# Patient Record
Sex: MALE | Race: ASIAN | Employment: STUDENT | URBAN - METROPOLITAN AREA
[De-identification: names, ages, dates, MRNs, and addresses within clinical notes are randomized per-mention and may not be internally consistent; named-entity substitution may affect disease eponyms.]

---
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
## 2021-05-28 ENCOUNTER — MOBILE ENCOUNTER (OUTPATIENT)
Dept: FAMILY MEDICINE CLINIC | Facility: CLINIC | Age: 21
End: 2021-05-28

## 2021-05-28 RX ORDER — ONDANSETRON 4 MG/1
4 TABLET, ORALLY DISINTEGRATING ORAL EVERY 8 HOURS PRN
Qty: 20 TABLET | Refills: 0 | Status: SHIPPED | OUTPATIENT
Start: 2021-05-28

## 2023-12-06 ENCOUNTER — OFFICE VISIT (OUTPATIENT)
Dept: OCCUPATIONAL MEDICINE | Age: 23
End: 2023-12-06
Attending: FAMILY MEDICINE

## 2023-12-06 DIAGNOSIS — Z01.84 IMMUNITY STATUS TESTING: Primary | ICD-10-CM

## 2023-12-06 LAB
HBV SURFACE AB SER QL: REACTIVE
HBV SURFACE AB SERPL IA-ACNC: >1000 MIU/ML
RUBV IGG SER QL: POSITIVE
RUBV IGG SER-ACNC: 130.2 IU/ML (ref 10–?)

## 2023-12-06 PROCEDURE — 86762 RUBELLA ANTIBODY: CPT

## 2023-12-06 PROCEDURE — 86787 VARICELLA-ZOSTER ANTIBODY: CPT

## 2023-12-06 PROCEDURE — 86706 HEP B SURFACE ANTIBODY: CPT

## 2023-12-06 PROCEDURE — 86735 MUMPS ANTIBODY: CPT

## 2023-12-06 PROCEDURE — 86765 RUBEOLA ANTIBODY: CPT

## 2023-12-08 LAB
MEV IGG SER-ACNC: 20.4 AU/ML (ref 16.5–?)
MUV IGG SER IA-ACNC: 62.4 AU/ML (ref 11–?)
VZV IGG SER IA-ACNC: 608.4 (ref 165–?)

## (undated) NOTE — MR AVS SNAPSHOT
[facility]ID[facility] Aguilera  [address] [place] [address]  [phone]               Thank you for choosing us for your health care visit with ROBERTO Patten.   We are glad to serve you and happy to provide you with this summary of your visit An initiative of the [employer]    Fact Sheet: Healthy Active Living for Families    Healthy nutrition starts as early as infancy with breastfeeding.  Once your baby begins eating solid foods, introduce nutritious foods early on and ofte